# Patient Record
Sex: MALE | Race: WHITE | URBAN - METROPOLITAN AREA
[De-identification: names, ages, dates, MRNs, and addresses within clinical notes are randomized per-mention and may not be internally consistent; named-entity substitution may affect disease eponyms.]

---

## 2018-11-16 ENCOUNTER — TELEPHONE (OUTPATIENT)
Dept: DERMATOLOGY | Age: 43
End: 2018-11-16

## 2018-12-14 ENCOUNTER — PROCEDURE VISIT (OUTPATIENT)
Dept: DERMATOLOGY | Age: 43
End: 2018-12-14
Payer: COMMERCIAL

## 2018-12-14 DIAGNOSIS — L72.0 EPIDERMOID CYST: Primary | ICD-10-CM

## 2018-12-14 PROCEDURE — 11402 EXC TR-EXT B9+MARG 1.1-2 CM: CPT | Performed by: DERMATOLOGY

## 2018-12-14 PROCEDURE — 12032 INTMD RPR S/A/T/EXT 2.6-7.5: CPT | Performed by: DERMATOLOGY

## 2018-12-14 NOTE — PATIENT INSTRUCTIONS
Care of Wound Closed with Dermabond    A special skin glue called Dermabond was used to hold your wound together on the surface of the skin. The glue film will loosen from your skin on its own as the wound heals. Follow these care guidelines:    · Keep the wound dry. · Do not pick, scratch or rub the glue on the wound so it does not loosen before the wound heals. · Do not soak your wound in water until the glue film falls off. Avoid swimming or using a hot tub while the glue is in place. · When you shower or bathe, let water run over the wound but do not rub. Pat the wound gently with a soft towel to dry. · Do no apply any cream, lotion or ointment to the skin near the wound. It could loosen the glue before the wound heals. · Do not apply any tape, sticky dressing, or alcohol to the glue site for 10 days. These could also loosen the glue. Call your doctor if you have any of these signs of infection:    · Skin around the wound is more red, swollen or feels hot.     · Fluid builds up under the glue    · Wound smells bad    · Pus drainage    · Fever     · Increasing pain

## 2020-08-31 NOTE — TELEPHONE ENCOUNTER
Pt calling need a refill on medication fluocinonide 0.05% cream last time seen in 2019 pls return call back @ [phone] to discuss

## 2020-08-31 NOTE — TELEPHONE ENCOUNTER
LOV: 12/2018  Rx was prescribed in 2015. Patient currently has poison ivy and will contact his PCP's office.